# Patient Record
Sex: FEMALE | Race: ASIAN | NOT HISPANIC OR LATINO | Employment: STUDENT | URBAN - METROPOLITAN AREA
[De-identification: names, ages, dates, MRNs, and addresses within clinical notes are randomized per-mention and may not be internally consistent; named-entity substitution may affect disease eponyms.]

---

## 2022-11-15 LAB
C TRACH DNA SPEC QL PROBE+SIG AMP: NEGATIVE
HEPATITIS B SURFACE ANTIGEN (EXTERNAL): NEGATIVE
HEPATITIS C ANTIBODY (EXTERNAL): NEGATIVE
HIV1+2 AB SERPL QL IA: NONREACTIVE
N GONORRHOEA DNA SPEC QL PROBE+SIG AMP: NEGATIVE
RUBELLA ANTIBODY IGG (EXTERNAL): NORMAL
SPECIMEN DESCRIP: NORMAL
SPECIMEN DESCRIPTION: NORMAL

## 2023-01-05 ENCOUNTER — TRANSFERRED RECORDS (OUTPATIENT)
Dept: HEALTH INFORMATION MANAGEMENT | Facility: CLINIC | Age: 28
End: 2023-01-05

## 2023-01-05 ENCOUNTER — MEDICAL CORRESPONDENCE (OUTPATIENT)
Dept: HEALTH INFORMATION MANAGEMENT | Facility: CLINIC | Age: 28
End: 2023-01-05

## 2023-01-09 ENCOUNTER — TRANSCRIBE ORDERS (OUTPATIENT)
Dept: MATERNAL FETAL MEDICINE | Facility: CLINIC | Age: 28
End: 2023-01-09

## 2023-01-09 DIAGNOSIS — O26.90 PREGNANCY RELATED CONDITION, ANTEPARTUM: Primary | ICD-10-CM

## 2023-01-24 ENCOUNTER — TRANSFERRED RECORDS (OUTPATIENT)
Dept: HEALTH INFORMATION MANAGEMENT | Facility: CLINIC | Age: 28
End: 2023-01-24
Payer: COMMERCIAL

## 2023-01-24 ENCOUNTER — PRE VISIT (OUTPATIENT)
Dept: MATERNAL FETAL MEDICINE | Facility: CLINIC | Age: 28
End: 2023-01-24
Payer: COMMERCIAL

## 2023-01-31 ENCOUNTER — HOSPITAL ENCOUNTER (OUTPATIENT)
Dept: ULTRASOUND IMAGING | Facility: CLINIC | Age: 28
Discharge: HOME | End: 2023-01-31
Attending: OBSTETRICS & GYNECOLOGY
Payer: COMMERCIAL

## 2023-01-31 ENCOUNTER — OFFICE VISIT (OUTPATIENT)
Dept: MATERNAL FETAL MEDICINE | Facility: CLINIC | Age: 28
End: 2023-01-31
Attending: OBSTETRICS & GYNECOLOGY
Payer: COMMERCIAL

## 2023-01-31 DIAGNOSIS — Z84.3 FAMILY HISTORY OF CONSANGUINITY: Primary | ICD-10-CM

## 2023-01-31 DIAGNOSIS — O26.90 PREGNANCY RELATED CONDITION, ANTEPARTUM: ICD-10-CM

## 2023-01-31 DIAGNOSIS — O26.90 PREGNANCY RELATED CONDITION, ANTEPARTUM: Primary | ICD-10-CM

## 2023-01-31 DIAGNOSIS — Z84.3 FAMILY HISTORY OF CONSANGUINITY: ICD-10-CM

## 2023-01-31 PROCEDURE — 76811 OB US DETAILED SNGL FETUS: CPT | Mod: 26 | Performed by: OBSTETRICS & GYNECOLOGY

## 2023-01-31 PROCEDURE — 99202 OFFICE O/P NEW SF 15 MIN: CPT | Mod: 25 | Performed by: OBSTETRICS & GYNECOLOGY

## 2023-01-31 PROCEDURE — 76811 OB US DETAILED SNGL FETUS: CPT

## 2023-01-31 PROCEDURE — 96040 HC GENETIC COUNSELING, EACH 30 MINUTES: CPT | Performed by: GENETIC COUNSELOR, MS

## 2023-01-31 NOTE — PROGRESS NOTES
"[facility] Maternal Fetal Medicine Center  Genetic Counseling Consult    Patient:  Carlos Aguayo YOB: 1995   Date of Service:  23   MRN: [id number]    Carlos was seen at the [facility] Fetal [facility] Center for genetic counseling consultation as part of her appointment for comprehensive ultrasound due to consanguinity.  The patient was accompanied by her partner, Beto to today's visit.     IMPRESSION/ PLAN   1. Carlos underwent maternal serum screening in the first trimester, which was below the screening cut off. We reviewed that NIPT and amniocentesis remain available.     2. The couple reported that they are consanguineous 1st cousins. We reviewed increased risk for birth defects and autosomal recessive conditions. Carlos underwent inheritest expanded carrier screening through her primary OB including 144 genes which was negative. Carlos had a comprehensive (level II) ultrasound today.  Please see the ultrasound report for further details.    PREGNANCY HISTORY   /Parity:    Age at Delivery: 28 year old  Gestational Age: 19w6d    ROBY: 2023, by Ultrasound             No significant complications or exposures were reported in the current pregnancy.    MEDICAL HISTORY   Carlos s reported medical history is not expected to impact pregnancy management or risks to fetal development.       FAMILY HISTORY   A three-generation pedigree was obtained today and is scanned under the \"Media\" tab in Epic.     The couple reported that they are consanguineous 1st cousins. We reviewed risks associated with consanguinity given degree of relation. We discussed that couples who are closely related are at increased risks to have children with autosomal recessive genetic disorders. The chance that they are both carriers for the same autosomal recessive genetic disease is higher than if they were not related. Additionally, consanginous couples all have a " higher risk for birth defects in their offspring, with an estimated 1.7-2.8% increase over the background 3-5% general population risk for first cousins. We reviewed level II comprehensive ultrasound as a screening tool for birth defects in the current pregnancy. We also reviewed availability of expanded carrier screening. Carlos underwent inheritest expanded carrier screening through her primary OB including 144 genes which was negative, a copy of this report was available for review today. We reviewed that additional larger panels remain available. They were also encouraged to share this family history information with their pediatrician.     The reported family history is unremarkable for multiple miscarriages, stillbirths, birth defects, intellectual disabilities, and known genetic conditions.       CARRIER SCREENING   We reviewed carrier screening information in the context of reported consanguinity including increased risk for autosomal recessive conditions in offspring. Carlos underwent inheritest expanded carrier screening through her primary OB including 144 genes which was negative, a copy of this report was available for review today. Additional larger panels remain available.        RISK ASSESSMENT FOR CHROMOSOME CONDITIONS   We explained that the risk for fetal chromosome abnormalities increases with maternal age. We discussed specific features of common chromosome abnormalities, including Down syndrome, trisomy 13, trisomy 18, and sex chromosome trisomies.      At age 28 at midtrimester, the risk to have a baby with Down syndrome is 1 in 928.     At age 28 at midtrimester, the risk to have a baby with any chromosome abnormality is 1 in 464.       Carlos underwent maternal serum screening in the first trimester, which was reviewed today. The chance for Down syndrome after screening is 1/1600 and for trisomy 18 is 1/3000. We reviewed limitations of maternal serum screening.     GENETIC TESTING OPTIONS    Genetic testing during a pregnancy includes screening and diagnostic procedures.      We discussed the following screening options:   Non-invasive prenatal testing (NIPT)    Also called cell-free DNA screening because it detects chromosomes from the placenta in the pregnant person's blood    Can be done any time after 10 weeks gestation    Screens for trisomy 21, trisomy 18, trisomy 13, and sex chromosome aneuploidies    Cannot screen for open neural tube defects, maternal serum AFP after 15 weeks is recommended      We discussed the following ultrasound options:  Comprehensive level II ultrasound (Fetal Anatomy Ultrasound)    Ultrasound done between 18-20 weeks gestation    Screens for major birth defects and markers for aneuploidy (like trisomy 21 and trisomy 18)    Includes looking at the fetus/baby's growth, heart, organs (stomach, kidneys), placenta, and amniotic fluid      We discussed the following diagnostic options:   Amniocentesis    Invasive diagnostic procedure done after 15 weeks gestation    The procedure collects a small sample of amniotic fluid for the purpose of chromosomal testing and/or other genetic testing    Diagnostic result; more than 99% sensitivity for fetal chromosome abnormalities    Testing for AFP in the amniotic fluid can test for open neural tube defects      The benefits and limitations of noninvasive screening were reviewed. Screening tests provide a risk assessment (chance) specific to the pregnancy for certain fetal chromosome abnormalities but cannot definitively diagnose or exclude a fetal chromosome abnormality. Follow-up genetic counseling and consideration of diagnostic testing is recommended with any abnormal screening result. Diagnostic testing during a pregnancy is more certain and can test for more conditions. However, the tests do have a risk of miscarriage that requires careful consideration. These tests can detect fetal chromosome abnormalities with greater than 99%  certainty. Results can be compromised by maternal cell contamination or mosaicism and are limited by the resolution of current genetic testing technology. There is no screening or diagnostic test that detects all forms of birth defects or intellectual disability.     It was a pleasure to be involved with Carlos gupta care. Face-to-face time of the meeting was 30 minutes.    Winifred Haines MS, [facility]  Licensed Genetic Counselor  [facility]  Maternal Fetal Medicine  Ph: [phone]  [email or web address]

## 2023-01-31 NOTE — PROGRESS NOTES
The patient was seen for an ultrasound in the Maternal-Fetal Medicine Center at the [facility] today.  For a detailed report of the ultrasound examination, please see the ultrasound report which can be found under the imaging tab.    Shivani Salamanca MD  , OB/GYN  Maternal-Fetal Medicine  [phone] (Pager)

## 2023-01-31 NOTE — NURSING NOTE
Patient reports positive fetal movement, no pain, no contractions, leaking of fluid, or bleeding.   SBAR given to MACIE ROB, see their note in Epic.

## 2023-02-12 ENCOUNTER — HEALTH MAINTENANCE LETTER (OUTPATIENT)
Age: 28
End: 2023-02-12

## 2023-02-25 ENCOUNTER — OFFICE VISIT (OUTPATIENT)
Dept: URGENT CARE | Facility: URGENT CARE | Age: 28
End: 2023-02-25
Payer: COMMERCIAL

## 2023-02-25 VITALS
WEIGHT: 137 LBS | BODY MASS INDEX: 23.39 KG/M2 | SYSTOLIC BLOOD PRESSURE: 114 MMHG | TEMPERATURE: 98.2 F | OXYGEN SATURATION: 100 % | HEART RATE: 82 BPM | RESPIRATION RATE: 14 BRPM | HEIGHT: 64 IN | DIASTOLIC BLOOD PRESSURE: 76 MMHG

## 2023-02-25 DIAGNOSIS — N89.8 VAGINAL DISCHARGE: Primary | ICD-10-CM

## 2023-02-25 DIAGNOSIS — R31.9 HEMATURIA, UNSPECIFIED TYPE: ICD-10-CM

## 2023-02-25 LAB
ALBUMIN UR-MCNC: NEGATIVE MG/DL
APPEARANCE UR: CLEAR
BACTERIA #/AREA URNS HPF: ABNORMAL /HPF
BILIRUB UR QL STRIP: NEGATIVE
CLUE CELLS: ABNORMAL
COLOR UR AUTO: YELLOW
GLUCOSE UR STRIP-MCNC: NEGATIVE MG/DL
HGB UR QL STRIP: ABNORMAL
KETONES UR STRIP-MCNC: NEGATIVE MG/DL
LEUKOCYTE ESTERASE UR QL STRIP: NEGATIVE
NITRATE UR QL: NEGATIVE
PH UR STRIP: 7 [PH] (ref 5–7)
RBC #/AREA URNS AUTO: ABNORMAL /HPF
SP GR UR STRIP: 1.02 (ref 1–1.03)
SQUAMOUS #/AREA URNS AUTO: ABNORMAL /LPF
TRICHOMONAS, WET PREP: ABNORMAL
UROBILINOGEN UR STRIP-ACNC: 0.2 E.U./DL
WBC #/AREA URNS AUTO: ABNORMAL /HPF
WBC'S/HIGH POWER FIELD, WET PREP: ABNORMAL
YEAST, WET PREP: ABNORMAL

## 2023-02-25 PROCEDURE — 99202 OFFICE O/P NEW SF 15 MIN: CPT | Performed by: PHYSICIAN ASSISTANT

## 2023-02-25 PROCEDURE — 81001 URINALYSIS AUTO W/SCOPE: CPT | Performed by: PHYSICIAN ASSISTANT

## 2023-02-25 PROCEDURE — 87210 SMEAR WET MOUNT SALINE/INK: CPT | Performed by: PHYSICIAN ASSISTANT

## 2023-02-25 ASSESSMENT — ENCOUNTER SYMPTOMS
FEVER: 0
DYSURIA: 0
ABDOMINAL PAIN: 0

## 2023-02-25 ASSESSMENT — PAIN SCALES - GENERAL: PAINLEVEL: NO PAIN (0)

## 2023-02-25 NOTE — PROGRESS NOTES
"SUBJECTIVE:   Carlos Aguayo is a 27 year old female presenting with a chief complaint of   Chief Complaint   Patient presents with     Urgent Care     Patient states she is 22 weeks pregnant and having a change of color in ur vaginal discharge x 10-15 days. Patient states she has mild cramping but this is not a new symptom.  Patient states her OBGYN told her to come in to Urgent care if she wanted to be seen.        She is an established patient of [facility].  Patient presents with vaginal discharge that has changed from white to yellow over last 10 or so days.  Abdominal cramping, not new.  No bloody discharge, has felt baby move.  Next OB/gyne 3/3.          Review of Systems   Constitutional: Negative for fever.   Gastrointestinal: Negative for abdominal pain.   Genitourinary: Positive for vaginal discharge. Negative for dysuria.   All other systems reviewed and are negative.      History reviewed. No pertinent past medical history.  History reviewed. No pertinent family history.  No current outpatient medications on file.     Social History     Tobacco Use     Smoking status: Not on file     Smokeless tobacco: Not on file   Substance Use Topics     Alcohol use: Not on file       OBJECTIVE  /76   Pulse 82   Temp 98.2  F (36.8  C) (Oral)   Resp 14   Ht 1.626 m (5' 4\")   Wt 62.1 kg (137 lb)   LMP 09/04/2022   SpO2 100%   BMI 23.52 kg/m      Physical Exam    Labs:  Results for orders placed or performed in visit on 02/25/23 (from the past 24 hour(s))   UA macro with reflex to Microscopic and Culture - Clinc Collect    Specimen: Urine, Clean Catch   Result Value Ref Range    Color Urine Yellow Colorless, Straw, Light Yellow, Yellow    Appearance Urine Clear Clear    Glucose Urine Negative Negative mg/dL    Bilirubin Urine Negative Negative    Ketones Urine Negative Negative mg/dL    Specific Gravity Urine 1.020 1.003 - 1.035    Blood Urine Trace (A) Negative    pH Urine 7.0 5.0 - 7.0    Protein Albumin " Urine Negative Negative mg/dL    Urobilinogen Urine 0.2 0.2, 1.0 E.U./dL    Nitrite Urine Negative Negative    Leukocyte Esterase Urine Negative Negative   Wet prep - Clinic Collect    Specimen: Vagina; Swab   Result Value Ref Range    Trichomonas Absent Absent    Yeast Absent Absent    Clue Cells Absent Absent    WBCs/high power field 1+ (A) None   Urine Microscopic   Result Value Ref Range    Bacteria Urine Moderate (A) None Seen /HPF    RBC Urine 2-5 (A) 0-2 /HPF /HPF    WBC Urine 0-5 0-5 /HPF /HPF    Squamous Epithelials Urine Moderate (A) None Seen /LPF    Narrative    Urine Culture not indicated       X-Ray was not done.    ASSESSMENT:      ICD-10-CM    1. Vaginal discharge  N89.8 UA macro with reflex to Microscopic and Culture - Clinc Collect     Wet prep - Clinic Collect     Urine Microscopic      2. Hematuria, unspecified type  R31.9            Medical Decision Making:    Differential Diagnosis:  Gyn Problem: Vaginitis:  yeast, trichomonas vaginalis, bacterial vaginosis    Serious Comorbid Conditions:  Adult:  reviwewed    PLAN:    Patient education.  Discussed reasons to seek immediate medical attention.        Followup:    If not improving or if condition worsens, follow up with your Primary Care Provider, If not improving or if conditions worsens over the next 12-24 hours, go to the Emergency Department    There are no Patient Instructions on file for this visit.

## 2023-02-25 NOTE — LETTER
February 25, 2023      [address]  [address]    [place] [address]        Dear MsKayleehikojo,    Please see your test results from today's  appointment.     Component      Latest Ref Rng & Units 2/25/2023   Color Urine      Colorless, Straw, Light Yellow, Yellow Yellow   Appearance Urine      Clear Clear   Glucose Urine      Negative mg/dL Negative   Bilirubin Urine      Negative Negative   Ketones Urine      Negative mg/dL Negative   Specific Gravity Urine      1.003 - 1.035 1.020   Blood Urine      Negative Trace (A)   pH Urine      5.0 - 7.0 7.0   Protein Albumin Urine      Negative mg/dL Negative   Urobilinogen Urine      0.2, 1.0 E.U./dL 0.2   Nitrite Urine      Negative Negative   Leukocyte Esterase Urine      Negative Negative   Trichomonas      Absent Absent   Yeast      Absent Absent   Clue cells      Absent Absent   WBCs/high power field      None 1+ (A)   Bacteria Urine      None Seen /HPF Moderate (A)   RBC Urine      0-2 /HPF /HPF 2-5 (A)   WBC Urine      0-5 /HPF /HPF 0-5   Squamous Epithelial /LPF Urine      None Seen /LPF Moderate (A)         If you have any questions or concerns, please call the clinic at the number listed above.       Sincerely,    [facility]

## 2023-05-22 LAB — GROUP B STREPTOCOCCUS (EXTERNAL): NEGATIVE

## 2023-05-25 ENCOUNTER — NURSE TRIAGE (OUTPATIENT)
Dept: NURSING | Facility: CLINIC | Age: 28
End: 2023-05-25
Payer: COMMERCIAL

## 2023-05-26 NOTE — TELEPHONE ENCOUNTER
"OB Triage Call      Is patient's OB/Midwife with the [facility] or [facility]? [facility]- Proceed with triage     Reason for call: pt had some clear fluid coming from her vagina it was \"watery\" in nature.    Assessment: Pt states that the discharge was not foul smelling and did not have any color tinge to it. They are not reassured that it is not amniotic fluid and wish to go in to get checked out.     Plan: pt is ok to continue to monitor at home. Educated on when to call back.     Patient plans to deliver at [facility]    Patient's primary OB Provider is Dr. Gimenez.      Per protocol recommendations Patient to follow home care recommendations.       Is patient's delivering hospital on divert? Does not apply due to Patient given home care instructions      36w1d    Estimated Date of Delivery: 2023        OB History    Para Term  AB Living   1 0 0 0 0 0   SAB IAB Ectopic Multiple Live Births   0 0 0 0 0      # Outcome Date GA Lbr Marques/2nd Weight Sex Delivery Anes PTL Lv   1 Current                No results found for: GBS       Sheila Darden RN 23 8:33 PM  [facility] Nurse Advisor    Reason for Disposition    Normal vaginal discharge in pregnancy    Additional Information    Negative: [1] Pregnant 23 or more weeks AND [2] baby is moving less today (e.g., kick count < 5 in 1 hour or < 10 in 2 hours)    Negative: Patient sounds very sick or weak to the triager    Negative: [1] Constant abdominal pain AND [2] present > 2 hours    Negative: [1] Intermittent lower abdominal pain AND [2] present > 24 hours    Negative: [1] Pregnant 24-36 weeks () AND [2] pinkish or brownish mucous discharge    Negative: [1] Yellow or green vaginal discharge AND [2] fever    Negative: Painful rash with tiny water blisters    Negative: [1] Rash (e.g., redness, tiny bumps, sore) of genital area AND [2] present > 24 hours    Negative: Abnormal color vaginal discharge (i.e., yellow, green, gray)    " "Negative: Bad smelling vaginal discharge    Negative: Tender lump (swelling or \"ball\") at vaginal opening    Negative: [1] Symptoms of a \"yeast infection\" (i.e., itchy, white discharge, not bad smelling) AND [2] not improved > 3 days following CARE ADVICE    Negative: Patient is worried they have a sexually transmitted infection (STI)    Negative: Pain with sexual intercourse (dyspareunia)    Negative: [1] Pregnant 37 or more weeks (term) AND [2] pinkish or brownish mucous discharge    Negative: [1] Pregnant 37 or more weeks (term) AND [2] passed a small glob or chunk of mucous (may look like gelatin or snot)    Negative: [1] Rash (e.g., redness, tiny bumps, sore) of genital area AND [2] present < 24 hours    Negative: Symptoms of a vaginal yeast infection (i.e., white, thick, cottage-cheese-like, itchy, not bad smelling discharge)    Protocols used: PREGNANCY - VAGINAL [id number]    Sheila Darden RN on 5/25/2023 at 8:46 PM    "

## 2023-06-07 ENCOUNTER — HOSPITAL ENCOUNTER (INPATIENT)
Facility: CLINIC | Age: 28
LOS: 3 days | Discharge: HOME | End: 2023-06-10
Attending: SPECIALIST | Admitting: SPECIALIST
Payer: COMMERCIAL

## 2023-06-07 PROBLEM — Z34.90 ENCOUNTER FOR INDUCTION OF LABOR: Status: ACTIVE | Noted: 2023-06-07

## 2023-06-07 LAB
ABO/RH(D): NORMAL
ANTIBODY SCREEN: NEGATIVE
ERYTHROCYTE [DISTWIDTH] IN BLOOD BY AUTOMATED COUNT: 13.7 % (ref 10–15)
HCT VFR BLD AUTO: 33.7 % (ref 35–47)
HGB BLD-MCNC: 10.8 G/DL (ref 11.7–15.7)
MCH RBC QN AUTO: 25.4 PG (ref 26.5–33)
MCHC RBC AUTO-ENTMCNC: 32 G/DL (ref 31.5–36.5)
MCV RBC AUTO: 79 FL (ref 78–100)
PLATELET # BLD AUTO: 274 10E3/UL (ref 150–450)
RBC # BLD AUTO: 4.25 10E6/UL (ref 3.8–5.2)
SPECIMEN EXPIRATION DATE: NORMAL
WBC # BLD AUTO: 8.7 10E3/UL (ref 4–11)

## 2023-06-07 PROCEDURE — 250N000013 HC RX MED GY IP 250 OP 250 PS 637: Performed by: SPECIALIST

## 2023-06-07 PROCEDURE — 36415 COLL VENOUS BLD VENIPUNCTURE: CPT | Performed by: SPECIALIST

## 2023-06-07 PROCEDURE — 85027 COMPLETE CBC AUTOMATED: CPT | Performed by: SPECIALIST

## 2023-06-07 PROCEDURE — 86780 TREPONEMA PALLIDUM: CPT | Performed by: SPECIALIST

## 2023-06-07 PROCEDURE — 120N000001 HC R&B MED SURG/OB

## 2023-06-07 PROCEDURE — 86901 BLOOD TYPING SEROLOGIC RH(D): CPT | Performed by: SPECIALIST

## 2023-06-07 PROCEDURE — 86850 RBC ANTIBODY SCREEN: CPT | Performed by: SPECIALIST

## 2023-06-07 RX ORDER — METHYLERGONOVINE MALEATE 0.2 MG/ML
200 INJECTION INTRAVENOUS
Status: DISCONTINUED | OUTPATIENT
Start: 2023-06-07 | End: 2023-06-08 | Stop reason: HOSPADM

## 2023-06-07 RX ORDER — CARBOPROST TROMETHAMINE 250 UG/ML
250 INJECTION, SOLUTION INTRAMUSCULAR
Status: DISCONTINUED | OUTPATIENT
Start: 2023-06-07 | End: 2023-06-08 | Stop reason: HOSPADM

## 2023-06-07 RX ORDER — NALOXONE HYDROCHLORIDE 0.4 MG/ML
0.2 INJECTION, SOLUTION INTRAMUSCULAR; INTRAVENOUS; SUBCUTANEOUS
Status: DISCONTINUED | OUTPATIENT
Start: 2023-06-07 | End: 2023-06-08 | Stop reason: HOSPADM

## 2023-06-07 RX ORDER — KETOROLAC TROMETHAMINE 30 MG/ML
30 INJECTION, SOLUTION INTRAMUSCULAR; INTRAVENOUS
Status: DISCONTINUED | OUTPATIENT
Start: 2023-06-07 | End: 2023-06-08

## 2023-06-07 RX ORDER — TRANEXAMIC ACID 10 MG/ML
1 INJECTION, SOLUTION INTRAVENOUS EVERY 30 MIN PRN
Status: DISCONTINUED | OUTPATIENT
Start: 2023-06-07 | End: 2023-06-08 | Stop reason: HOSPADM

## 2023-06-07 RX ORDER — METOCLOPRAMIDE 10 MG/1
10 TABLET ORAL EVERY 6 HOURS PRN
Status: DISCONTINUED | OUTPATIENT
Start: 2023-06-07 | End: 2023-06-08 | Stop reason: HOSPADM

## 2023-06-07 RX ORDER — METOCLOPRAMIDE HYDROCHLORIDE 5 MG/ML
10 INJECTION INTRAMUSCULAR; INTRAVENOUS EVERY 6 HOURS PRN
Status: DISCONTINUED | OUTPATIENT
Start: 2023-06-07 | End: 2023-06-08 | Stop reason: HOSPADM

## 2023-06-07 RX ORDER — IBUPROFEN 400 MG/1
800 TABLET, FILM COATED ORAL
Status: DISCONTINUED | OUTPATIENT
Start: 2023-06-07 | End: 2023-06-08

## 2023-06-07 RX ORDER — MISOPROSTOL 200 UG/1
800 TABLET ORAL
Status: DISCONTINUED | OUTPATIENT
Start: 2023-06-07 | End: 2023-06-08 | Stop reason: HOSPADM

## 2023-06-07 RX ORDER — CITRIC ACID/SODIUM CITRATE 334-500MG
30 SOLUTION, ORAL ORAL
Status: DISCONTINUED | OUTPATIENT
Start: 2023-06-07 | End: 2023-06-08 | Stop reason: HOSPADM

## 2023-06-07 RX ORDER — OXYTOCIN 10 [USP'U]/ML
10 INJECTION, SOLUTION INTRAMUSCULAR; INTRAVENOUS
Status: DISCONTINUED | OUTPATIENT
Start: 2023-06-07 | End: 2023-06-08 | Stop reason: HOSPADM

## 2023-06-07 RX ORDER — MISOPROSTOL 200 UG/1
400 TABLET ORAL
Status: DISCONTINUED | OUTPATIENT
Start: 2023-06-07 | End: 2023-06-08 | Stop reason: HOSPADM

## 2023-06-07 RX ORDER — ONDANSETRON 2 MG/ML
4 INJECTION INTRAMUSCULAR; INTRAVENOUS EVERY 6 HOURS PRN
Status: DISCONTINUED | OUTPATIENT
Start: 2023-06-07 | End: 2023-06-08 | Stop reason: HOSPADM

## 2023-06-07 RX ORDER — OXYTOCIN 10 [USP'U]/ML
10 INJECTION, SOLUTION INTRAMUSCULAR; INTRAVENOUS
Status: DISCONTINUED | OUTPATIENT
Start: 2023-06-07 | End: 2023-06-09

## 2023-06-07 RX ORDER — ONDANSETRON 4 MG/1
4 TABLET, ORALLY DISINTEGRATING ORAL EVERY 6 HOURS PRN
Status: DISCONTINUED | OUTPATIENT
Start: 2023-06-07 | End: 2023-06-08 | Stop reason: HOSPADM

## 2023-06-07 RX ORDER — OXYTOCIN/0.9 % SODIUM CHLORIDE 30/500 ML
100-340 PLASTIC BAG, INJECTION (ML) INTRAVENOUS CONTINUOUS PRN
Status: DISCONTINUED | OUTPATIENT
Start: 2023-06-07 | End: 2023-06-09

## 2023-06-07 RX ORDER — NALOXONE HYDROCHLORIDE 0.4 MG/ML
0.4 INJECTION, SOLUTION INTRAMUSCULAR; INTRAVENOUS; SUBCUTANEOUS
Status: DISCONTINUED | OUTPATIENT
Start: 2023-06-07 | End: 2023-06-08 | Stop reason: HOSPADM

## 2023-06-07 RX ORDER — PROCHLORPERAZINE MALEATE 5 MG
10 TABLET ORAL EVERY 6 HOURS PRN
Status: DISCONTINUED | OUTPATIENT
Start: 2023-06-07 | End: 2023-06-08 | Stop reason: HOSPADM

## 2023-06-07 RX ORDER — PROCHLORPERAZINE 25 MG
25 SUPPOSITORY, RECTAL RECTAL EVERY 12 HOURS PRN
Status: DISCONTINUED | OUTPATIENT
Start: 2023-06-07 | End: 2023-06-08 | Stop reason: HOSPADM

## 2023-06-07 RX ORDER — OXYTOCIN/0.9 % SODIUM CHLORIDE 30/500 ML
340 PLASTIC BAG, INJECTION (ML) INTRAVENOUS CONTINUOUS PRN
Status: DISCONTINUED | OUTPATIENT
Start: 2023-06-07 | End: 2023-06-08 | Stop reason: HOSPADM

## 2023-06-07 RX ADMIN — DINOPROSTONE 10 MG: 10 INSERT VAGINAL at 20:58

## 2023-06-07 ASSESSMENT — ACTIVITIES OF DAILY LIVING (ADL)
ADLS_ACUITY_SCORE: 20
ADLS_ACUITY_SCORE: 20

## 2023-06-08 ENCOUNTER — ANESTHESIA (OUTPATIENT)
Dept: OBGYN | Facility: CLINIC | Age: 28
End: 2023-06-08
Payer: COMMERCIAL

## 2023-06-08 ENCOUNTER — ANESTHESIA EVENT (OUTPATIENT)
Dept: OBGYN | Facility: CLINIC | Age: 28
End: 2023-06-08
Payer: COMMERCIAL

## 2023-06-08 PROBLEM — O36.5990 IUGR (INTRAUTERINE GROWTH RESTRICTION) AFFECTING CARE OF MOTHER: Status: ACTIVE | Noted: 2023-06-08

## 2023-06-08 LAB — T PALLIDUM AB SER QL: NONREACTIVE

## 2023-06-08 PROCEDURE — 250N000011 HC RX IP 250 OP 636: Performed by: ANESTHESIOLOGY

## 2023-06-08 PROCEDURE — 258N000003 HC RX IP 258 OP 636: Performed by: SPECIALIST

## 2023-06-08 PROCEDURE — 3E0P7VZ INTRODUCTION OF HORMONE INTO FEMALE REPRODUCTIVE, VIA NATURAL OR ARTIFICIAL OPENING: ICD-10-PCS | Performed by: OBSTETRICS & GYNECOLOGY

## 2023-06-08 PROCEDURE — 0KQM0ZZ REPAIR PERINEUM MUSCLE, OPEN APPROACH: ICD-10-PCS | Performed by: OBSTETRICS & GYNECOLOGY

## 2023-06-08 PROCEDURE — 250N000013 HC RX MED GY IP 250 OP 250 PS 637: Performed by: OBSTETRICS & GYNECOLOGY

## 2023-06-08 PROCEDURE — 88307 TISSUE EXAM BY PATHOLOGIST: CPT | Mod: TC | Performed by: OBSTETRICS & GYNECOLOGY

## 2023-06-08 PROCEDURE — 3E0R3BZ INTRODUCTION OF ANESTHETIC AGENT INTO SPINAL CANAL, PERCUTANEOUS APPROACH: ICD-10-PCS | Performed by: OBSTETRICS & GYNECOLOGY

## 2023-06-08 PROCEDURE — 250N000009 HC RX 250: Performed by: SPECIALIST

## 2023-06-08 PROCEDURE — 250N000011 HC RX IP 250 OP 636: Performed by: SPECIALIST

## 2023-06-08 PROCEDURE — 250N000013 HC RX MED GY IP 250 OP 250 PS 637: Performed by: SPECIALIST

## 2023-06-08 PROCEDURE — 722N000001 HC LABOR CARE VAGINAL DELIVERY SINGLE

## 2023-06-08 PROCEDURE — 120N000012 HC R&B POSTPARTUM

## 2023-06-08 PROCEDURE — 370N000003 HC ANESTHESIA WARD SERVICE: Performed by: ANESTHESIOLOGY

## 2023-06-08 PROCEDURE — 00HU33Z INSERTION OF INFUSION DEVICE INTO SPINAL CANAL, PERCUTANEOUS APPROACH: ICD-10-PCS | Performed by: OBSTETRICS & GYNECOLOGY

## 2023-06-08 RX ORDER — MODIFIED LANOLIN
OINTMENT (GRAM) TOPICAL
Status: DISCONTINUED | OUTPATIENT
Start: 2023-06-08 | End: 2023-06-10 | Stop reason: HOSPADM

## 2023-06-08 RX ORDER — METHYLERGONOVINE MALEATE 0.2 MG/ML
200 INJECTION INTRAVENOUS
Status: DISCONTINUED | OUTPATIENT
Start: 2023-06-08 | End: 2023-06-10 | Stop reason: HOSPADM

## 2023-06-08 RX ORDER — IBUPROFEN 400 MG/1
800 TABLET, FILM COATED ORAL EVERY 6 HOURS PRN
Status: DISCONTINUED | OUTPATIENT
Start: 2023-06-08 | End: 2023-06-10 | Stop reason: HOSPADM

## 2023-06-08 RX ORDER — ACETAMINOPHEN 325 MG/1
650 TABLET ORAL EVERY 4 HOURS PRN
Status: DISCONTINUED | OUTPATIENT
Start: 2023-06-08 | End: 2023-06-10 | Stop reason: HOSPADM

## 2023-06-08 RX ORDER — OXYTOCIN 10 [USP'U]/ML
10 INJECTION, SOLUTION INTRAMUSCULAR; INTRAVENOUS
Status: DISCONTINUED | OUTPATIENT
Start: 2023-06-08 | End: 2023-06-10 | Stop reason: HOSPADM

## 2023-06-08 RX ORDER — DOCUSATE SODIUM 100 MG/1
100 CAPSULE, LIQUID FILLED ORAL DAILY
Status: DISCONTINUED | OUTPATIENT
Start: 2023-06-08 | End: 2023-06-10 | Stop reason: HOSPADM

## 2023-06-08 RX ORDER — NALBUPHINE HYDROCHLORIDE 20 MG/ML
2.5-5 INJECTION, SOLUTION INTRAMUSCULAR; INTRAVENOUS; SUBCUTANEOUS EVERY 6 HOURS PRN
Status: DISCONTINUED | OUTPATIENT
Start: 2023-06-08 | End: 2023-06-10 | Stop reason: HOSPADM

## 2023-06-08 RX ORDER — ONDANSETRON 2 MG/ML
4 INJECTION INTRAMUSCULAR; INTRAVENOUS EVERY 6 HOURS PRN
Status: DISCONTINUED | OUTPATIENT
Start: 2023-06-08 | End: 2023-06-08 | Stop reason: HOSPADM

## 2023-06-08 RX ORDER — OXYTOCIN/0.9 % SODIUM CHLORIDE 30/500 ML
340 PLASTIC BAG, INJECTION (ML) INTRAVENOUS CONTINUOUS PRN
Status: DISCONTINUED | OUTPATIENT
Start: 2023-06-08 | End: 2023-06-10 | Stop reason: HOSPADM

## 2023-06-08 RX ORDER — HYDROCORTISONE 25 MG/G
CREAM TOPICAL 3 TIMES DAILY PRN
Status: DISCONTINUED | OUTPATIENT
Start: 2023-06-08 | End: 2023-06-10 | Stop reason: HOSPADM

## 2023-06-08 RX ORDER — SODIUM CHLORIDE, SODIUM LACTATE, POTASSIUM CHLORIDE, CALCIUM CHLORIDE 600; 310; 30; 20 MG/100ML; MG/100ML; MG/100ML; MG/100ML
INJECTION, SOLUTION INTRAVENOUS CONTINUOUS
Status: DISCONTINUED | OUTPATIENT
Start: 2023-06-08 | End: 2023-06-10 | Stop reason: HOSPADM

## 2023-06-08 RX ORDER — FENTANYL CITRATE-0.9 % NACL/PF 10 MCG/ML
100 PLASTIC BAG, INJECTION (ML) INTRAVENOUS EVERY 5 MIN PRN
Status: DISCONTINUED | OUTPATIENT
Start: 2023-06-08 | End: 2023-06-08 | Stop reason: HOSPADM

## 2023-06-08 RX ORDER — ONDANSETRON 4 MG/1
4 TABLET, ORALLY DISINTEGRATING ORAL EVERY 6 HOURS PRN
Status: DISCONTINUED | OUTPATIENT
Start: 2023-06-08 | End: 2023-06-08 | Stop reason: HOSPADM

## 2023-06-08 RX ORDER — MISOPROSTOL 200 UG/1
400 TABLET ORAL
Status: DISCONTINUED | OUTPATIENT
Start: 2023-06-08 | End: 2023-06-10 | Stop reason: HOSPADM

## 2023-06-08 RX ORDER — BISACODYL 10 MG
10 SUPPOSITORY, RECTAL RECTAL DAILY PRN
Status: DISCONTINUED | OUTPATIENT
Start: 2023-06-08 | End: 2023-06-10 | Stop reason: HOSPADM

## 2023-06-08 RX ORDER — MISOPROSTOL 200 UG/1
800 TABLET ORAL
Status: DISCONTINUED | OUTPATIENT
Start: 2023-06-08 | End: 2023-06-10 | Stop reason: HOSPADM

## 2023-06-08 RX ORDER — TRANEXAMIC ACID 10 MG/ML
1 INJECTION, SOLUTION INTRAVENOUS EVERY 30 MIN PRN
Status: DISCONTINUED | OUTPATIENT
Start: 2023-06-08 | End: 2023-06-10 | Stop reason: HOSPADM

## 2023-06-08 RX ORDER — CARBOPROST TROMETHAMINE 250 UG/ML
250 INJECTION, SOLUTION INTRAMUSCULAR
Status: DISCONTINUED | OUTPATIENT
Start: 2023-06-08 | End: 2023-06-10 | Stop reason: HOSPADM

## 2023-06-08 RX ADMIN — Medication 12 ML/HR: at 06:18

## 2023-06-08 RX ADMIN — SODIUM CHLORIDE, POTASSIUM CHLORIDE, SODIUM LACTATE AND CALCIUM CHLORIDE 1000 ML: 600; 310; 30; 20 INJECTION, SOLUTION INTRAVENOUS at 03:53

## 2023-06-08 RX ADMIN — IBUPROFEN 800 MG: 400 TABLET ORAL at 20:27

## 2023-06-08 RX ADMIN — ACETAMINOPHEN 650 MG: 325 TABLET ORAL at 13:44

## 2023-06-08 RX ADMIN — Medication 340 ML/HR: at 11:58

## 2023-06-08 RX ADMIN — MISOPROSTOL 800 MCG: 200 TABLET ORAL at 11:42

## 2023-06-08 RX ADMIN — Medication 340 ML/HR: at 11:38

## 2023-06-08 RX ADMIN — METHYLERGONOVINE MALEATE 200 MCG: 0.2 INJECTION INTRAVENOUS at 11:45

## 2023-06-08 RX ADMIN — SODIUM CHLORIDE, POTASSIUM CHLORIDE, SODIUM LACTATE AND CALCIUM CHLORIDE: 600; 310; 30; 20 INJECTION, SOLUTION INTRAVENOUS at 07:23

## 2023-06-08 RX ADMIN — ACETAMINOPHEN 650 MG: 325 TABLET ORAL at 20:27

## 2023-06-08 RX ADMIN — IBUPROFEN 800 MG: 400 TABLET ORAL at 13:43

## 2023-06-08 RX ADMIN — DOCUSATE SODIUM 100 MG: 100 CAPSULE, LIQUID FILLED ORAL at 13:44

## 2023-06-08 RX ADMIN — LIDOCAINE HYDROCHLORIDE 20 ML: 10 INJECTION, SOLUTION EPIDURAL; INFILTRATION; INTRACAUDAL; PERINEURAL at 11:39

## 2023-06-08 ASSESSMENT — ACTIVITIES OF DAILY LIVING (ADL)
ADLS_ACUITY_SCORE: 24
ADLS_ACUITY_SCORE: 20
ADLS_ACUITY_SCORE: 20
ADLS_ACUITY_SCORE: 24
ADLS_ACUITY_SCORE: 24
ADLS_ACUITY_SCORE: 20
ADLS_ACUITY_SCORE: 24
ADLS_ACUITY_SCORE: 20
ADLS_ACUITY_SCORE: 24
ADLS_ACUITY_SCORE: 24

## 2023-06-08 NOTE — ANESTHESIA PREPROCEDURE EVALUATION
Anesthesia Pre-Procedure Evaluation    Patient: Carlos Aguayo   MRN: [id number] : 1995        Procedure :           No past medical history on file.   No past surgical history on file.   No Known Allergies   Social History     Tobacco Use     Smoking status: Not on file     Smokeless tobacco: Not on file   Vaping Use     Vaping status: Not on file   Substance Use Topics     Alcohol use: Not on file      Wt Readings from Last 1 Encounters:   23 62.1 kg (137 lb)        Anesthesia Evaluation            ROS/MED HX  ENT/Pulmonary:    (-) asthma   Neurologic:  - neg neurologic ROS     Cardiovascular:    (-) PIH   METS/Exercise Tolerance:     Hematologic:     (+) no anticoagulation therapy, no coagulopathy,     Musculoskeletal:       GI/Hepatic:     (+) GERD,     Renal/Genitourinary:       Endo:       Psychiatric/Substance Use:       Infectious Disease:       Malignancy:       Other:            Physical Exam    Airway        Mallampati: II   TM distance: > 3 FB   Neck ROM: full     Respiratory Devices and Support         Dental  no notable dental history         Cardiovascular   cardiovascular exam normal          Pulmonary   pulmonary exam normal                OUTSIDE LABS:  CBC:   Lab Results   Component Value Date    WBC 8.7 2023    HGB 10.8 (L) 2023    HCT 33.7 (L) 2023     2023     BMP: No results found for: NA, POTASSIUM, CHLORIDE, CO2, BUN, CR, GLC  COAGS: No results found for: PTT, INR, FIBR  POC: No results found for: BGM, HCG, HCGS  HEPATIC: No results found for: ALBUMIN, PROTTOTAL, ALT, AST, GGT, ALKPHOS, BILITOTAL, BILIDIRECT, VIJAY  OTHER: No results found for: PH, LACT, A1C, ALEXUS, PHOS, MAG, LIPASE, AMYLASE, TSH, T4, T3, CRP, SED    Anesthesia Plan    ASA Status:  2      Anesthesia Type: Epidural.              Consents    Anesthesia Plan(s) and associated risks, benefits, and realistic alternatives discussed. Questions answered and patient/representative(s)  expressed understanding.    - Discussed:     - Discussed with:  Patient         Postoperative Care            Comments:    Other Comments: Orders to manage the epidural infusion have been entered, and through coordination with the nurse, we will continute to manage and monitor the patient's labor epidural.  We will continuously be available to adjust as needed thruout the entire L&D process.             Jarret Anthony MD

## 2023-06-08 NOTE — PROGRESS NOTES
VAGINAL DELIVERY NOTE    Delivery type:  Vaginal delivery  Intrauterine pregnancy at  38 1/7  weeks.  Pregnancy complications/risks: fetal growth restriction (3%)  Labor Type:  induced  Labor Analgesia: epidural  ROM:  Spontaneous  Fetal Monitoring:  Category 2  Gender: female  Apgars: pending  Birth Weight:  5#6    Description of Delivery:  Presented for ripening/IOL at 38 wks for fetal growth restriction (3%ile).   Received cervidil.   SROM at 0430.   Epidural.   After epidural was 3 cm dilated, and developed category 2 FHR tracing (intermittent variables and lates) with moderate variability.  Progressed rapidly, was 9 cm at 2 hours later, and complete 1 hour later.   Upon first onset pushing fetal head already visible at introitus.  Pushed very effectively for approx 45 minutes.   We had pt push on her side for majority of the time due to FHR decels w/ pushing.  NNP present at delivery.  Uncomplicated vaginal delivery.  Infant placed on mom's abdomen, initially vigorous with strong cry.   Moved to wamer a few minutes later, at direction of NNP.   Required 02, and brought to NICU.   Placenta delivered spontaneously, intact.    Received methergine + cytotec for uterine atony.   Repaired bilateral sulcus, left labial, 2nd degree lacs w/ vicryl.  QBL  250 ml.  Mom stable.         See Labor and Delivery console for information regarding labor length, and times for complete, pushing, and delivery.       Maria A Palencia MD  6/8/23

## 2023-06-08 NOTE — PROGRESS NOTES
Labor Progress      Comfortable w/ epidural.  S/P cervidil, then SROM occurred.  Was 3 cm at 0745 (RN exam).  FHR tracing reveals persistent decelerations (combination of lates and variables) over approx the past 2 hours, moderate variability.    /57   Pulse 82   Temp 97.8  F (36.6  C) (Temporal)   Resp 18   LMP 09/04/2022   SpO2 98%       Cervix:  9/100/zero      Hemoglobin   Date Value Ref Range Status   06/07/2023 10.8 (L) 11.7 - 15.7 g/dL Final   ]        Plan:  Keep pt on her side.  Continuous EFM.  Recheck cervix in 1  Hour, once complete will start pushing.  Reviewed my concerns re FHR tracing w/ pt and .        Maria A Palencia MD  6/8/23

## 2023-06-08 NOTE — H&P
"  2023    Carlos Aguayo  [id number]            OB Admit History & Physical      Ms. Aguayo  is here for induction of labor for IUGR    She has noticed fetal growth restriction.  Noted at 33 1/7 weeks , growth at 3.8 %.  Had reassuring fetal monitoring.   Admit last pm.   Cervidil placed.  Noted painful contractions.  SROM , cervidil removed.  Now comfortable with labor epidural.     Patient's last menstrual period was 2022.   Her Estimated Date of Delivery: 2023  , making her 38w1d  wks.      Estimated body mass index is 23.52 kg/m  as calculated from the following:    Height as of 23: 1.626 m (5' 4\").    Weight as of 23: 62.1 kg (137 lb).  Her prenatal course has been  complicated by IUGR  See prenatal for labs.  neg GBBS, Rubella  Immune, RH pos    Estimated fetal weight= 2900 gm       She is a 28 year old   Her OB history:   OB History    Para Term  AB Living   1 0 0 0 0 0   SAB IAB Ectopic Multiple Live Births   0 0 0 0 0      # Outcome Date GA Lbr Marques/2nd Weight Sex Delivery Anes PTL Lv   1 Current                   No past medical history on file.     No past surgical history on file.      No current outpatient medications on file.       Allergies: Patient has no known allergies.      REVIEW OF SYSTEMS:  NEUROLOGIC:  Negative  EYES:  Negative  ENT:  Negative  GI:  Negative  BREAST:  Negative  :  Negative  GYN:  Negative  CV:  Negative  PULMONARY:  Negative  MUSCULOSKELETAL:  Negative  PSYCH:  Negative        Social History     Socioeconomic History     Marital status:      Spouse name: Not on file     Number of children: Not on file     Years of education: Not on file     Highest education level: Not on file   Occupational History     Not on file   Tobacco Use     Smoking status: Not on file     Smokeless tobacco: Not on file   Vaping Use     Vaping status: Not on file   Substance and Sexual Activity     Alcohol use: Not on file     Drug use: Not on " file     Sexual activity: Not on file   Other Topics Concern     Not on file   Social History Narrative     Not on file     Social Determinants of Health     Financial Resource Strain: Not on file   Food Insecurity: Not on file   Transportation Needs: Not on file   Physical Activity: Not on file   Stress: Not on file   Social Connections: Not on file   Intimate Partner Violence: Not on file   Housing Stability: Not on file      No family history on file.          Vitals:     With contractions every  2 min    Alert Awake in NAD  HEENT grossly normal  Neck: no lymphadenopathy or thryoidomegaly  Lungs WNL  Back no spinal or CVAT  Heart WNL  ABD gravid,  Pelvic:  clear fluid noted, no blood noted  Cervix is 3 cm / 90 % effaced at 0 station  EXT:  no edema or calf tenderness  Neuro:  WNL    Assessment:  IUP at 38w1d , IUGR    Plan:  Will begin pitocin as needed.   Anticipate NSDV.         Vonnie Argueta MD MD  Dept of OB/GYN  June 8, 2023

## 2023-06-08 NOTE — PLAN OF CARE
Data: Patient admitted to room 232 at 1935. Patient is a . Prenatal record reviewed.   OB History    Para Term  AB Living   1 0 0 0 0 0   SAB IAB Ectopic Multiple Live Births   0 0 0 0 0      # Outcome Date GA Lbr Marques/2nd Weight Sex Delivery Anes PTL Lv   1 Current            .  Medical History: No past medical history on file..  Gestational age 38w0d. Vital signs per doc flowsheet. Fetal movement present. Patient reports No chief complaint on file.   as reason for admission. Support persons Beto (spouse) & Chioma (mother) present.  Action: Care of patient assumed at that time. Verbal consent for EFM, external fetal monitors applied. Admission assessment completed. Patient and support persons educated on labor process. Patient instructed to report change in fetal movement, contractions, vaginal leaking of fluid or bleeding, abdominal pain, or any concerns related to the pregnancy to her nurse/physician. Patient oriented to room, call light in reach.   Response: Dr. Argueta informed of patient's arrival. Plan per provider is induction of labor with cervidil. Patient verbalized understanding of education and verbalized agreement with plan. Patient coping with labor via planned epidural.

## 2023-06-08 NOTE — ANESTHESIA PROCEDURE NOTES
"Epidural catheter Procedure Note    Pre-Procedure   Staff -        Anesthesiologist:  Jarret Anthony MD       Performed By: anesthesiologist       Referred By: OB       Location: OB       Pre-Anesthestic Checklist: patient identified, IV checked, risks and benefits discussed, informed consent, monitors and equipment checked and pre-op evaluation  Timeout:       Correct Patient: Yes        Correct Procedure: Yes        Correct Site: Yes        Correct Position: Yes   Procedure Documentation  Procedure: epidural catheter       Patient Position: sitting       Patient Prep/Sterile Barriers: sterile gloves, mask, patient draped       Skin prep: Chloraprep       Local skin infiltrated with 3 mL of 2% lidocaine.        Insertion Site: L3-4. (midline approach).       Technique: LORT saline        ALICIA at 5 cm.       Needle Type: ToTrustevy needle       Needle Gauge: 17.        Needle Length (Inches): 3.5        Catheter: 19 G.          Catheter threaded easily.         3 cm epidural space.         Threaded 8 cm at skin.         # of attempts: 1 and  # of redirects:     Assessment/Narrative         Paresthesias: No.       Test dose of 3 mL lidocaine 1.5% w/ 1:200,000 epinephrine at 06:21 CDT.         Test dose negative, 3 minutes after injection, for signs of intravascular, subdural, or intrathecal injection.       Insertion/Infusion Method: LORT saline       Aspiration negative for Heme or CSF via Epidural Catheter.    Medication(s) Administered   0.125% Bupivacaine + 2 mcg/mL Fentanyl via CADD (Epidural) - EPIDURAL   10 mL - 6/8/2023 6:24:00 AM    FOR [facility] ([facility]) ONLY:   Pain Team Contact information: please page the Pain Team Via [employer]. Search \"Pain\". During daytime hours, please page the attending first. At night please page the resident first.      "

## 2023-06-09 PROCEDURE — 120N000012 HC R&B POSTPARTUM

## 2023-06-09 PROCEDURE — 250N000013 HC RX MED GY IP 250 OP 250 PS 637: Performed by: OBSTETRICS & GYNECOLOGY

## 2023-06-09 RX ORDER — NALOXONE HYDROCHLORIDE 0.4 MG/ML
0.2 INJECTION, SOLUTION INTRAMUSCULAR; INTRAVENOUS; SUBCUTANEOUS
Status: DISCONTINUED | OUTPATIENT
Start: 2023-06-09 | End: 2023-06-10 | Stop reason: HOSPADM

## 2023-06-09 RX ORDER — NALOXONE HYDROCHLORIDE 0.4 MG/ML
0.4 INJECTION, SOLUTION INTRAMUSCULAR; INTRAVENOUS; SUBCUTANEOUS
Status: DISCONTINUED | OUTPATIENT
Start: 2023-06-09 | End: 2023-06-10 | Stop reason: HOSPADM

## 2023-06-09 RX ADMIN — IBUPROFEN 800 MG: 400 TABLET ORAL at 10:10

## 2023-06-09 RX ADMIN — ACETAMINOPHEN 650 MG: 325 TABLET ORAL at 17:30

## 2023-06-09 RX ADMIN — ACETAMINOPHEN 650 MG: 325 TABLET ORAL at 23:33

## 2023-06-09 RX ADMIN — IBUPROFEN 800 MG: 400 TABLET ORAL at 17:30

## 2023-06-09 RX ADMIN — IBUPROFEN 800 MG: 400 TABLET ORAL at 23:33

## 2023-06-09 RX ADMIN — DOCUSATE SODIUM 100 MG: 100 CAPSULE, LIQUID FILLED ORAL at 03:43

## 2023-06-09 RX ADMIN — ACETAMINOPHEN 650 MG: 325 TABLET ORAL at 10:10

## 2023-06-09 RX ADMIN — ACETAMINOPHEN 650 MG: 325 TABLET ORAL at 03:43

## 2023-06-09 RX ADMIN — IBUPROFEN 800 MG: 400 TABLET ORAL at 03:43

## 2023-06-09 ASSESSMENT — ACTIVITIES OF DAILY LIVING (ADL)
ADLS_ACUITY_SCORE: 20

## 2023-06-09 NOTE — PROGRESS NOTES
[facility]   Post-Partum Progress Note          Assessment and Plan:    Assessment:   Post-partum day #1  Normal spontaneous vaginal delivery  L&D complications: None      Doing well.  No excessive bleeding  Pain well-controlled.      Plan:   Ambulation encouraged  Breast feeding strategies discussed  Anticipate discharge tomorrow           Interval History:   Doing well.  Pain is well-controlled.  No fevers.  No history of foul-smelling vaginal discharge.  Good appetite.  Denies chest pain, shortness of breath, nausea or vomiting.  Vaginal bleeding is similar to a heavy menstrual flow.  Ambulatory.  Breastfeeding with some difficulty          Significant Problems:    No past medical history on file.          Review of Systems:    The patient denies any chest pain, shortness of breath, excessive pain, fever, chills, purulent drainage from the wound, nausea or vomiting.          Medications:   All medications related to the patient's surgery have been reviewed          Physical Exam:     All vitals stable  Patient Vitals for the past 24 hrs:   BP Temp Temp src Pulse Resp SpO2   06/09/23 0915 118/74 97.6  F (36.4  C) Oral 98 16 --   06/09/23 0410 115/74 98  F (36.7  C) Oral 77 16 --   06/09/23 0055 126/84 97.7  F (36.5  C) Oral 102 16 --   06/08/23 2130 130/82 98.2  F (36.8  C) Oral 83 16 --   06/08/23 1529 119/75 98  F (36.7  C) Oral 80 18 --   06/08/23 1345 117/66 -- -- -- -- --   06/08/23 1330 131/69 -- -- -- -- 99 %   06/08/23 1315 120/74 -- -- -- -- 99 %   06/08/23 1300 125/60 -- -- -- -- 97 %   06/08/23 1245 129/67 98.6  F (37  C) Temporal -- -- 99 %   06/08/23 1230 97/59 -- -- -- -- 99 %   06/08/23 1215 108/59 -- -- -- -- 99 %   06/08/23 1200 124/61 -- -- -- -- 99 %   06/08/23 1158 124/61 -- -- -- -- 99 %   06/08/23 1145 119/59 -- -- -- -- 98 %   06/08/23 1130 128/68 98.6  F (37  C) -- -- -- 98 %   06/08/23 1115 125/57 -- -- -- -- 96 %   06/08/23 1100 132/71 -- -- -- -- 99 %        Intake/Output Summary (Last 24 hours) at 6/9/2023 1045  Last data filed at 6/9/2023 0045  Gross per 24 hour   Intake 1700 ml   Output 2950 ml   Net -1250 ml       Uterine fundus is firm, non-tender and at the level of the umbilicus  Ext NT          Data:     All laboratory data related to this surgery reviewed  Hemoglobin   Date Value Ref Range Status   06/07/2023 10.8 (L) 11.7 - 15.7 g/dL Final     No imaging studies have been ordered    Janay Tilley MD

## 2023-06-09 NOTE — PLAN OF CARE
Goal Outcome Evaluation:      Plan of Care Reviewed With: patient, spouse    Overall Patient Progress: improvingOverall Patient Progress: improving    Vital signs stable. Postpartum assessment WDL. Pain controlled with  tylenol and Ibuprofen.  Patient is voiding but still has residual up to 700cc.  Continue to monitor urine output. Breastfeeding on cue with 1x assist. Baby is sleepy at breast. Start pumping because baby is SGA.  Finger feeding donor milk.  Father is going home and pt mother is staying to help. Patient and infant bonding well. Will continue with current plan of care.

## 2023-06-09 NOTE — PLAN OF CARE
At 0040 Documenting RN entered the patient's room as infant was due to feed at 0100. RN did not see infant in bassinet when entering the room. Did not see infant in bed with Mother. RN approached the couch where Grandma was sleeping and found the infant sleeping next to Grandma in a infant sleeping bag type device. RN tried to wake Grandma by rubbing her arm and saying her name. Grandma did not awake at this time. RN unzipped infant from sleeping bag device and placed her in the bassinet. RN woke up Mom at this time and reminded her of safe sleep practices such as only sleeping in the bassinet/not co-sleeping and that the infant cannot have any loose articles around her/in the bassinet (such as sleeping bag device). Mom translated this to Grandma who does not speak English.     At 0130 Grandma placed loose blanket over infant in the bassinet. This is the second time that this has happened since RN started shift at 7pm. Mom and Grandma reminded again of safe sleep practices.

## 2023-06-09 NOTE — PLAN OF CARE
"Vital signs stable. Postpartum assessment WDL. Pain controlled with tylenol and ibuprofen. Using abdominal binder and warm packs to abdomen for comfort. Complaining of perineal discomfort- using ice packs, tucks, perineal numbing spray, and donut pillow. Patient had issues retaining urine at beginning of documenting RN's shift. Was straight cathed for 900 mL at 1940. Patient able to void adequately multiple times with post-void bladder scans being 17 and 18 mL. Patient working on breastfeeding infant every 2-3 hours. Patient needing full assistance with breastfeeding, she is unable to get/keep infant latched unless RN is holding infant at the breast. Patient is also quick to unlatch infant and say \"she is done\" when infant cries at the breast even though infant is still showing feeding cues. RN providing lots of breastfeeding education and discussed importance of infant feeding every 2-3 hours for adequate amounts of time. Supplementing infant after feedings with donor breast milk. Parents declining use of bottle. Parents did bring a bottle from home that has a spoon attached to it (like you would use to feed infant baby food) that they wanted to use to feed infant donor milk. RN discussed that this should not be used at this time to feed infant due to it not being the appropriate use with breast milk. Patient pumping after feedings- getting nothing. Declined use of hospital breast pump and is using Spectra pump from home. Patient also needing frequent reminders about safe sleep practices (please see previous note from same RN for details). Will continue with current plan of care.        "

## 2023-06-09 NOTE — PLAN OF CARE
VSS (tachy), she c/o soreness on her perineum, she's using Tylenol/Ibuprofen, and she's using ice packs/tucks.  She's ambulating in the room and was encouraged to ambulate in the hallways.  She's working on breastfeeding, she required a full staff assist for correct positioning, and to verify a correct latch.  The pt is also pumping and supplementing with DM via FF.  Lactation following, will continue to assist

## 2023-06-09 NOTE — ANESTHESIA POSTPROCEDURE EVALUATION
Patient: Carlos Aguayo    Procedure: * No procedures listed *       Anesthesia Type:  Epidural    Note:     Postop Pain Control:    PONV:    Neuro/Psych:    Airway/Respiratory:    CV/Hemodynamics:    Other NRE:    DID A NON-ROUTINE EVENT OCCUR?     Event details/Postop Comments:  Unable to contact patient for post-epidural check           Last vitals:  Vitals:    06/09/23 0410 06/09/23 0915 06/09/23 1500   BP: 115/74 118/74 126/83   Pulse: 77 98 102   Resp: 16 16 16   Temp: 36.7  C (98  F) 36.4  C (97.6  F) 36.7  C (98.1  F)   SpO2:          Electronically Signed By: Brody Gan MD  June 9, 2023  6:05 PM

## 2023-06-09 NOTE — LACTATION NOTE
Routine Lactation visit with Calros, significant other Beto & baby girl. Of note, baby girl was IUGR and SGA. She's been supplementing with each feeding with donor milk and Carlos is pumping. She's been working on getting baby to breast for a short time with each feeding as well. At time of visit, baby ready to feed. Reviewed positioning on both sides in cross cradle hold and encouraged Carlos to hold her breast with one hand and hold baby with other, throughout feeding. Stressed importance of helping baby stay at breast by holding her close.     Since baby is SGA, recommend keeping breastfeeding sessions to 10-15 minutes per side, then supplementing afterward. Carlos & Beto express great concern about introducing a bottle. Baby has finger fed and is somewhat uncoordinated, but is improving with suck and was able to take 10 ml at last finger feed, fed by dad. Discussed options for home and parents stated they had planned to supplement with a spoon feeder they brought from home. LC checked flow of spoon feeder and feels it is too fast and unregulated to use with a sleepy baby. Reviewed concerns with family and do not recommend using this feeding method at this time. Plan to continue to finger feed for now, while volume amounts are slow, and will reassess supplementation method closer to discharge.    Reviewed milk supply and engorgement. Reviewed how our bodies prepare to make milk and early milk supply. Discussed it's perfectly normal to only get drops or moisture when pumping at this stage.  Stressed importance of continuing to pump after each feeding until baby no longer needs supplementation. Encouraged to review Breastfeeding section in Your Guide to Postpartum & [place] [facility]. Discussed typical  feeding patterns, cluster feeding, and ways to wake a sleepy baby for feedings.    Feeding plan: Recommend short, 10-20 minute frequent breast feedings: At least 8 - 12 times every 24 hours. Supplement  after each feeding. Carlos to pump with each feeding.  Encouraged use of feeding log and to record feedings, and void/stool patterns. Carlos has a pump for home use.  Encouraged to call with needs, will revisit as needed. Carlos Pérez appreciative of visit.    Bety Lomeli, RN-C, IBCLC, MNN, PHN, BSN

## 2023-06-10 VITALS
RESPIRATION RATE: 16 BRPM | OXYGEN SATURATION: 99 % | WEIGHT: 161.8 LBS | BODY MASS INDEX: 27.77 KG/M2 | TEMPERATURE: 97.9 F | HEART RATE: 69 BPM | DIASTOLIC BLOOD PRESSURE: 66 MMHG | SYSTOLIC BLOOD PRESSURE: 107 MMHG

## 2023-06-10 LAB
PATH REPORT.COMMENTS IMP SPEC: NORMAL
PATH REPORT.COMMENTS IMP SPEC: NORMAL
PATH REPORT.FINAL DX SPEC: NORMAL
PATH REPORT.GROSS SPEC: NORMAL
PATH REPORT.MICROSCOPIC SPEC OTHER STN: NORMAL
PATH REPORT.RELEVANT HX SPEC: NORMAL
PHOTO IMAGE: NORMAL

## 2023-06-10 PROCEDURE — 250N000013 HC RX MED GY IP 250 OP 250 PS 637: Performed by: OBSTETRICS & GYNECOLOGY

## 2023-06-10 PROCEDURE — 88307 TISSUE EXAM BY PATHOLOGIST: CPT | Mod: 26 | Performed by: PATHOLOGY

## 2023-06-10 RX ORDER — ACETAMINOPHEN 325 MG/1
650 TABLET ORAL EVERY 4 HOURS PRN
COMMUNITY
Start: 2023-06-10

## 2023-06-10 RX ORDER — IBUPROFEN 200 MG
600 TABLET ORAL EVERY 6 HOURS PRN
COMMUNITY
Start: 2023-06-10

## 2023-06-10 RX ORDER — DOCUSATE SODIUM 100 MG/1
100 CAPSULE, LIQUID FILLED ORAL 2 TIMES DAILY PRN
COMMUNITY
Start: 2023-06-10

## 2023-06-10 RX ADMIN — DOCUSATE SODIUM 100 MG: 100 CAPSULE, LIQUID FILLED ORAL at 09:09

## 2023-06-10 RX ADMIN — ACETAMINOPHEN 650 MG: 325 TABLET ORAL at 12:07

## 2023-06-10 RX ADMIN — IBUPROFEN 800 MG: 400 TABLET ORAL at 12:08

## 2023-06-10 RX ADMIN — ACETAMINOPHEN 650 MG: 325 TABLET ORAL at 05:34

## 2023-06-10 RX ADMIN — IBUPROFEN 800 MG: 400 TABLET ORAL at 05:34

## 2023-06-10 ASSESSMENT — ACTIVITIES OF DAILY LIVING (ADL)
ADLS_ACUITY_SCORE: 20

## 2023-06-10 NOTE — PROGRESS NOTES
"PPD#2   IOL for IUGR 38 weeks    Doing well.  Tired.  Bottom \"burns\" and would like me to look at it.  Ambulating, voiding okay.  Normal lochia.  Breastfeeding and has pump.  Will be discharged today.  Baby is well.    /66 (BP Location: Right arm, Patient Position: Semi-Harris's)   Pulse 69   Temp 97.9  F (36.6  C) (Oral)   Resp 16   LMP 2022   SpO2 99%   Breastfeeding Unknown   Temp (24hrs), Av.8  F (36.6  C), Min:97.3  F (36.3  C), Max:98.1  F (36.7  C)    Gen alert NAD  Abd soft/NT/ND/FF  Perineum lac well approximated without edema or ecchymosis.  No erythema or warmth.  Extr tr BLADIMIR bilat/NT/symm    Hemoglobin   Date Value Ref Range Status   2023 10.8 (L) 11.7 - 15.7 g/dL Final   ]  Imp: Normal postpartum course    Plan: discharge home today.  Instructions rev.  Has pump.  F/u in 2 weeks with primary OB.      "

## 2023-06-10 NOTE — DISCHARGE INSTRUCTIONS

## 2023-06-10 NOTE — PLAN OF CARE
Goal Outcome Evaluation:    Vital signs stable. Postpartum assessment WDL. Pain controlled with tylenol and ibuprofen. Patient voiding without difficulty. Breastfeeding on cue with moderate assist, supplementing with 15 of similac. Patient and infant bonding well. Re-educated on safe sleep practices as well as safe feeding practices with pt. Will continue with current plan of care.

## 2023-06-10 NOTE — PLAN OF CARE
D: VSS, assessments WDL.   I: Pt. received complete discharge paperwork and pt. was given times of last dose for all discharge medications in writing on discharge medication sheets.  Discharge teaching included home medication, pain management, activity restrictions, postpartum cares, and signs and symptoms of infection.    A: Discharge outcomes on care plan met.  Mother states understanding and comfort with self and baby cares.  P: Pt. discharged to home.  Pt. was discharged with baby, and bands were checked at time of discharge.  Pt. was accompanied by , nurse and baby, and left with personal belongings.  Home care referral sent.  Pt. to follow up with OB per MD order.  Pt. had no further questions at the time of discharge and no unmet needs were identified.

## 2023-06-10 NOTE — DISCHARGE SUMMARY
[facility] Discharge Summary    Carlos Aguayo MRN# [id number]   Age: 28 year old YOB: 1995     Date of Admission:  2023  Date of Discharge::  6/10/2023  Admitting Physician:  Vonnie Argueta MD  Discharge Physician:  Jasmine Hanna MD     Home clinic: [facility]          Admission Diagnoses:   Encounter for induction of labor [Z34.90]          Discharge Diagnosis:   Normal spontaneous vaginal delivery  IUGR  Intrauterine pregnancy at 38 weeks gestation          Procedures:   Procedure(s): No additional procedures performed       No other procedures performed during this admission           Medications Prior to Admission:   The patient was not taking any medications prior to admission          Discharge Medications:     Current Discharge Medication List      START taking these medications    Details   acetaminophen (TYLENOL) 325 MG tablet Take 2 tablets (650 mg) by mouth every 4 hours as needed for mild pain or fever (greater than or equal to 38  C /100.4  F (oral) or 38.5  C/ 101.4  F (core).)    Associated Diagnoses: Vaginal delivery      docusate sodium (COLACE) 100 MG capsule Take 1 capsule (100 mg) by mouth 2 times daily as needed for constipation    Associated Diagnoses: Vaginal delivery      ibuprofen (ADVIL/MOTRIN) 200 MG tablet Take 3 tablets (600 mg) by mouth every 6 hours as needed for other (cramping)    Associated Diagnoses: Vaginal delivery                   Consultations:   No consultations were requested during this admission          Brief History of Labor:   Induction for IUGR at 38 weeks.  Rapid labor progression.  Uncomplicated .           Hospital Course:   The patient's hospital course was unremarkable.  On discharge, her pain was well controlled. Vaginal bleeding is similar to peak menstrual flow.  Voiding without difficulty.  Ambulating well and tolerating a normal diet.  No fever.  Breastfeeding well.  Infant is stable.     She was discharged on post-partum day #2.    Post-partum hemoglobin:   Hemoglobin   Date Value Ref Range Status   06/07/2023 10.8 (L) 11.7 - 15.7 g/dL Final             Discharge Instructions and Follow-Up:   Discharge diet: Regular   Discharge activity: Pelvic rest: abstain from intercourse and do not use tampons for 6 week(s)  No strenuous exercise for 6 weeks.   Discharge follow-up: Follow up with primary OB in 2 and 6 weeks   Wound care: Drink plenty of fluids  Ice to area for comfort  Keep wound clean and dry           Discharge Disposition:   Discharged to home      Attestation:  I have reviewed today's vital signs, notes, medications, labs and imaging.    Jasmine Hanna MD

## 2023-06-10 NOTE — LACTATION NOTE
Follow up Lactation visit with Carlos & baby girl. Getting ready for discharge. Carlos shared she's exhausted, baby has been cluster feeding and not sleeping well in between feedings. Offered support and encouragement. Reinforced normality of cluster feeding and encouraged feeding on demand. Discussed increasing the amount of supplementation per feeding as baby tolerates. Since baby was showing feeding cues at time of visit, recommended breastfeeding with Lactation assist. Carlos able to get baby latched at both breast with minimal coaching and baby latched well. She seems to fatigue at breast quickly, and LC reviewed how this is normal considering her size.  Reviewed techniques to entice her to latch. Encouraged Carlos to keep baby's experiences at breast positive. Let Carlos know she's doing a great job.    Discussed cluster feeding, what it is and when to expect it, The Second Night, satiety cues, feeding cues, and reviewed Feeding Log for home use. Encouraged to review Breastfeeding section in Your Guide to Postpartum & [place] [facility].     Reviewed milk supply and engorgement. Reviewed typical timeline of milk supply initiation and progression over first 3-5 days postpartum. Discussed comfort measures for engorgement, plugged duct treatment, and warning signs of breast infection. Stressed the importance of pumping with each feeding until baby no longer needs supplementation.    Feeding plan: Recommend short,  frequent breast feedings: At least 8 - 12 times every 24 hours. Recommended feeding at both breasts for a short time, then using Dr. Alarcon's bottle they brought from home to supplement with expressed milk or formula.  Encouraged use of feeding log and to record feedings, and void/stool patterns. Carlos has a breast pump for home use. Follow up with Joanne darling, encouraged to see Lactation in clinic within the week due to feeding difficulties and supplementation at discharge. Reviewed outpatient  lactation resources. Carlos appreciative of visit.    Bety Lomeli RN-C, IBCLC, MNN, PHN, BSN

## 2024-03-10 ENCOUNTER — HEALTH MAINTENANCE LETTER (OUTPATIENT)
Age: 29
End: 2024-03-10

## 2025-03-16 ENCOUNTER — HEALTH MAINTENANCE LETTER (OUTPATIENT)
Age: 30
End: 2025-03-16